# Patient Record
Sex: FEMALE | Race: OTHER | NOT HISPANIC OR LATINO | URBAN - METROPOLITAN AREA
[De-identification: names, ages, dates, MRNs, and addresses within clinical notes are randomized per-mention and may not be internally consistent; named-entity substitution may affect disease eponyms.]

---

## 2019-06-18 ENCOUNTER — EMERGENCY (EMERGENCY)
Facility: HOSPITAL | Age: 84
LOS: 1 days | Discharge: HOME | End: 2019-06-18
Attending: STUDENT IN AN ORGANIZED HEALTH CARE EDUCATION/TRAINING PROGRAM
Payer: MEDICAID

## 2019-06-18 VITALS
TEMPERATURE: 98 F | RESPIRATION RATE: 20 BRPM | WEIGHT: 89.95 LBS | OXYGEN SATURATION: 97 % | SYSTOLIC BLOOD PRESSURE: 130 MMHG | HEIGHT: 64 IN | DIASTOLIC BLOOD PRESSURE: 74 MMHG | HEART RATE: 80 BPM

## 2019-06-18 PROCEDURE — 82962 GLUCOSE BLOOD TEST: CPT

## 2019-06-18 PROCEDURE — 73630 X-RAY EXAM OF FOOT: CPT | Mod: 26,RT

## 2019-06-18 PROCEDURE — T1013: CPT

## 2019-06-18 PROCEDURE — 99283 EMERGENCY DEPT VISIT LOW MDM: CPT

## 2019-06-18 PROCEDURE — 73630 X-RAY EXAM OF FOOT: CPT

## 2019-06-18 NOTE — ED STATDOCS - SKIN, MLM
Great toe with medial aspect 1x2cm granuloma ulcer with mild amount of superficial erythema. not warm to touch. skin normal color for race, dry and intact.

## 2019-06-18 NOTE — ED STATDOCS - OBJECTIVE STATEMENT
89 y/o F pt with no significant PMHx presents to the ED c/o worsening foot ulcer for the past 2 weeks. Pt reports that she has had this foot ulcer for 1 month, however, it has become more swollen, painful, and indurated in the last 2 weeks. Pt states they went to the [facility] clinic and was advised to come to the ED for reeval to r/o acute infection. Denies fever, chills, n/v/d, loss of sensation.

## 2019-06-18 NOTE — ED STATDOCS - ATTENDING CONTRIBUTION TO CARE
I performed a face to face history and physical exam of the patient and discussed their management with the resident/ACP. I reviewed the resident/ACP's note and agree with the documented findings and plan of care.    Pt states that for the past 1-2 months she has had worsening ulcer to the medial portion of her R great toe. Pt went to [facility] clinic today and was sent to the ER for further eval. no hx of DM.    physical - 1x1 cm ulceration to medial R toe.  minimal erythema. clean base.     plan - xr reviewed. podiatry consulted and recommended abx and outpatient f/up.
